# Patient Record
Sex: MALE | Race: WHITE | NOT HISPANIC OR LATINO | URBAN - METROPOLITAN AREA
[De-identification: names, ages, dates, MRNs, and addresses within clinical notes are randomized per-mention and may not be internally consistent; named-entity substitution may affect disease eponyms.]

---

## 2017-06-07 PROBLEM — Z00.00 ENCOUNTER FOR PREVENTIVE HEALTH EXAMINATION: Status: ACTIVE | Noted: 2017-06-07

## 2017-06-13 ENCOUNTER — OUTPATIENT (OUTPATIENT)
Dept: OUTPATIENT SERVICES | Facility: HOSPITAL | Age: 72
LOS: 1 days | End: 2017-06-13
Payer: MEDICARE

## 2017-06-13 ENCOUNTER — APPOINTMENT (OUTPATIENT)
Dept: ULTRASOUND IMAGING | Facility: CLINIC | Age: 72
End: 2017-06-13

## 2017-06-13 DIAGNOSIS — Z00.8 ENCOUNTER FOR OTHER GENERAL EXAMINATION: ICD-10-CM

## 2017-06-13 PROCEDURE — 93880 EXTRACRANIAL BILAT STUDY: CPT

## 2019-01-16 ENCOUNTER — MESSAGE (OUTPATIENT)
Age: 74
End: 2019-01-16

## 2019-01-17 ENCOUNTER — APPOINTMENT (OUTPATIENT)
Dept: UROLOGY | Facility: CLINIC | Age: 74
End: 2019-01-17
Payer: MEDICARE

## 2019-01-17 ENCOUNTER — RESULT CHARGE (OUTPATIENT)
Age: 74
End: 2019-01-17

## 2019-01-17 VITALS
DIASTOLIC BLOOD PRESSURE: 85 MMHG | HEART RATE: 73 BPM | WEIGHT: 152 LBS | SYSTOLIC BLOOD PRESSURE: 156 MMHG | BODY MASS INDEX: 21.76 KG/M2 | RESPIRATION RATE: 14 BRPM | HEIGHT: 70 IN

## 2019-01-17 DIAGNOSIS — Z86.39 PERSONAL HISTORY OF OTHER ENDOCRINE, NUTRITIONAL AND METABOLIC DISEASE: ICD-10-CM

## 2019-01-17 DIAGNOSIS — R31.29 OTHER MICROSCOPIC HEMATURIA: ICD-10-CM

## 2019-01-17 DIAGNOSIS — Z86.79 PERSONAL HISTORY OF OTHER DISEASES OF THE CIRCULATORY SYSTEM: ICD-10-CM

## 2019-01-17 DIAGNOSIS — Z87.448 PERSONAL HISTORY OF OTHER DISEASES OF URINARY SYSTEM: ICD-10-CM

## 2019-01-17 LAB
BILIRUB UR QL STRIP: NORMAL
CLARITY UR: CLEAR
COLLECTION METHOD: NORMAL
GLUCOSE UR-MCNC: NORMAL
HCG UR QL: 0.2 EU/DL
HGB UR QL STRIP.AUTO: NORMAL
KETONES UR-MCNC: NORMAL
LEUKOCYTE ESTERASE UR QL STRIP: NORMAL
NITRITE UR QL STRIP: NORMAL
PH UR STRIP: 5.5
PROT UR STRIP-MCNC: NORMAL
SP GR UR STRIP: 1.01

## 2019-01-17 PROCEDURE — 99204 OFFICE O/P NEW MOD 45 MIN: CPT

## 2019-01-21 PROBLEM — Z86.79 HISTORY OF HYPERTENSION: Status: RESOLVED | Noted: 2019-01-17 | Resolved: 2019-01-21

## 2019-01-21 PROBLEM — Z87.448 HISTORY OF HEMATURIA: Status: RESOLVED | Noted: 2019-01-17 | Resolved: 2019-01-21

## 2019-01-21 PROBLEM — Z86.39 HISTORY OF DIABETES MELLITUS: Status: RESOLVED | Noted: 2019-01-17 | Resolved: 2019-01-21

## 2019-01-21 PROBLEM — R31.29 MICROHEMATURIA: Status: ACTIVE | Noted: 2019-01-21

## 2019-01-21 RX ORDER — SIMVASTATIN 80 MG/1
80 TABLET, FILM COATED ORAL
Refills: 0 | Status: ACTIVE | COMMUNITY

## 2019-01-21 RX ORDER — METFORMIN HYDROCHLORIDE 500 MG/1
500 TABLET, COATED ORAL
Refills: 0 | Status: ACTIVE | COMMUNITY

## 2019-01-21 NOTE — ASSESSMENT
[FreeTextEntry1] : Reviewed outside records. \par \par Benign Prostatic Hyperplasia:\par No bothersome lower urinary tract symptoms and minimal post void residual. \par \par Bladder mass:\par Microhematuria:\par Discussed the differential diagnosis of hematuria including benign and malignant pathology- including but not limited to nephrolithiasis, bladder stone, urinary tract infection, glomerular disease, renal cancer, bladder cancer, prostate cancer. We also discussed the chance that workup will not reveal a source for the bleeding. The patient understands that the hematuria could be from an upper tract (kidney or ureter) or lower tract (bladder, urethra, prostate) and that workup includes imaging and direct visualization of all of these.\par \par Will proceed with work up with Urinalysis with microscopy, Urine culture, Urine cytology, CT Urogram and Cystoscopy. \par \par Return to office after CT scan. \par

## 2019-01-21 NOTE — PHYSICAL EXAM
[General Appearance - Well Developed] : well developed [Normal Appearance] : normal appearance [General Appearance - In No Acute Distress] : no acute distress [] : no respiratory distress [Abdomen Soft] : soft [Abdomen Tenderness] : non-tender [Abdomen Mass (___ Cm)] : no abdominal mass palpated [Costovertebral Angle Tenderness] : no ~M costovertebral angle tenderness [Urethral Meatus] : meatus normal [Penis Abnormality] : normal circumcised penis [Scrotum] : the scrotum was normal [Epididymis] : the epididymides were normal [Testes Tenderness] : no tenderness of the testes [Testes Mass (___cm)] : there were no testicular masses [Normal Station and Gait] : the gait and station were normal for the patient's age [Skin Color & Pigmentation] : normal skin color and pigmentation [No Focal Deficits] : no focal deficits [Oriented To Time, Place, And Person] : oriented to person, place, and time [No Palpable Adenopathy] : no palpable adenopathy [FreeTextEntry1] : Prostate partially palpated, non tender, no nodule in the palpated part

## 2019-01-21 NOTE — HISTORY OF PRESENT ILLNESS
[FreeTextEntry1] : 72 yo male presents for Bladder mass.\par Recently had urine test and was told has microscopic hematuria. \par Underwent Renal and Bladder Ultrasound- Bladder mass.\par Denies gross hematuria, no history of kidney stones or recurrent urinary tract infections. \par Smoker- past, 1PPD for 30 yrs, quit- 15 yrs ago.\par Occupational exposure- No.\par Reports reasonable stream, urinates every few hours or so during the day depending on fluid: water, coffee/tea and soda intake. Nocturia of 0-1 x. \par Denies hesitancy, straining, intermittency, urgency, incontinence, sense of incomplete emptying.\par Denies dysuria, lower abdominal or flank pain, fever, chills or rigors.\par Normal erections and libido. \par No family history of Prostate cancer.

## 2019-01-21 NOTE — LETTER BODY
[Dear  ___] : Dear  [unfilled], [Consult Letter:] : I had the pleasure of evaluating your patient, [unfilled]. [( Thank you for referring [unfilled] for consultation for _____ )] : Thank you for referring [unfilled] for consultation for [unfilled] [Please see my note below.] : Please see my note below. [Consult Closing:] : Thank you very much for allowing me to participate in the care of this patient.  If you have any questions, please do not hesitate to contact me. [Sincerely,] : Sincerely, [FreeTextEntry3] : Naren Webb MD\par  of Urology\par [facility]\par \par Offices:\par [facility] @\par [address], [place] [address]\par and\par [address], [place] [address], [address]\par and\par [address] [address]\par \par TEL: [phone]\par FAX: [phone]\par

## 2019-01-29 ENCOUNTER — MESSAGE (OUTPATIENT)
Age: 74
End: 2019-01-29

## 2019-01-31 ENCOUNTER — APPOINTMENT (OUTPATIENT)
Dept: UROLOGY | Facility: CLINIC | Age: 74
End: 2019-01-31
Payer: MEDICARE

## 2019-01-31 VITALS
DIASTOLIC BLOOD PRESSURE: 80 MMHG | BODY MASS INDEX: 21.76 KG/M2 | WEIGHT: 152 LBS | SYSTOLIC BLOOD PRESSURE: 153 MMHG | HEART RATE: 79 BPM | HEIGHT: 70 IN

## 2019-01-31 DIAGNOSIS — N32.89 OTHER SPECIFIED DISORDERS OF BLADDER: ICD-10-CM

## 2019-01-31 PROCEDURE — 51798 US URINE CAPACITY MEASURE: CPT | Mod: 59

## 2019-01-31 PROCEDURE — 51741 ELECTRO-UROFLOWMETRY FIRST: CPT

## 2019-01-31 PROCEDURE — 99214 OFFICE O/P EST MOD 30 MIN: CPT | Mod: 25

## 2019-01-31 PROCEDURE — 52000 CYSTOURETHROSCOPY: CPT

## 2019-01-31 NOTE — ASSESSMENT
[FreeTextEntry1] : Kidney stone:\par Discussed the management options for non obstructing kidney stones- watch and wait Vs Ureteroscopy Vs Shock wave lithotripsy(if radio-opaque) Vs Percutaneous nephrolithotomy. Risks and benefits of each modality were discussed. Pt not interested in active intervention at this point. \par Recommended Kidney stone prevention diet:\par Good oral hydration so that urine is clear to light yellow, usually 1.5 to 2 Liters of fluids, mainly water\par Increasing Citrate in diet by consuming citrus fruits and juices- willa, limes, oranges, grapefruits and berries \par Less red meat\par Less salt\par Limit foods with oxalate like- dark green vegetables, rhubarb, chocolate, wheat bran, nuts, cranberries, and beans\par \par Benign Prostatic Hyperplasia:\par See Uroflo and PVR.\par Prescribed Flomax. Explained common side effects: nasal congestion, cough, muscle pain, back pain, dizziness, orthostatic hypotension, somnolence, retrograde ejaculation, decreased libido and erectile dysfunction.\par  \par Bladder mass:\par On Cystoscopy today: no Bladder mass, Moderate prostatic enlargement with coapting lateral lobes and a medium median lobe, trabeculations and multiple cellules. \par Will get Urinalysis with reflex Urine culture and Urine cytology.\par \par Return to office in 3 months or sooner if any issues- will do Uroflo and PVR.

## 2019-01-31 NOTE — LETTER BODY
[Dear  ___] : Dear  [unfilled], [Courtesy Letter:] : I had the pleasure of seeing your patient, [unfilled], in my office today. [Please see my note below.] : Please see my note below. [Sincerely,] : Sincerely, [FreeTextEntry3] : Naren Webb MD\par  of Urology\par [facility]\par \par Offices:\par [facility] @\par [address], [place] [address]\par and\par [address], [place] [address], [address]\par and\par [address] [address]\par \par TEL: [phone]\par FAX: [phone]

## 2019-01-31 NOTE — HISTORY OF PRESENT ILLNESS
[FreeTextEntry1] : 72 yo male presents for follow up. \par No new complaint. \par Denies dysuria, hematuria, lower abdominal or flank pain, fever, chills or rigors. \par \par Initially seen for Bladder mass.\par Recently had urine test and was told has microscopic hematuria. \par Underwent Renal and Bladder Ultrasound- Bladder mass.\par Denied gross hematuria, no history of kidney stones or recurrent urinary tract infections. \par Smoker- past, 1PPD for 30 yrs, quit- 15 yrs ago.\par Occupational exposure- No.\par Reported reasonable stream, urinates every few hours or so during the day depending on fluid: water, coffee/tea and soda intake. Nocturia of 0-1 x. \par Denied hesitancy, straining, intermittency, urgency, incontinence, sense of incomplete emptying.\par \par Normal erections and libido. \par No family history of Prostate cancer.

## 2019-02-01 LAB
APPEARANCE: CLEAR
BILIRUBIN URINE: NEGATIVE
BLOOD URINE: NEGATIVE
COLOR: YELLOW
GLUCOSE QUALITATIVE U: NEGATIVE MG/DL
KETONES URINE: NEGATIVE
LEUKOCYTE ESTERASE URINE: NEGATIVE
NITRITE URINE: NEGATIVE
PH URINE: 7.5
PROTEIN URINE: NEGATIVE MG/DL
SPECIFIC GRAVITY URINE: 1.01
UROBILINOGEN URINE: NEGATIVE MG/DL

## 2019-04-19 ENCOUNTER — MEDICATION RENEWAL (OUTPATIENT)
Age: 74
End: 2019-04-19

## 2019-05-09 ENCOUNTER — APPOINTMENT (OUTPATIENT)
Dept: UROLOGY | Facility: CLINIC | Age: 74
End: 2019-05-09
Payer: MEDICARE

## 2019-05-09 PROCEDURE — 51798 US URINE CAPACITY MEASURE: CPT

## 2019-05-09 PROCEDURE — 99214 OFFICE O/P EST MOD 30 MIN: CPT | Mod: 25

## 2019-05-13 NOTE — ASSESSMENT
[FreeTextEntry1] : Kidney stone:\par Continue to observe.\par \par Benign Prostatic Hyperplasia:\par Continue Flomax.\par \par Return to office in 6 months or sooner if any issues.

## 2019-05-13 NOTE — LETTER BODY
[Courtesy Letter:] : I had the pleasure of seeing your patient, [unfilled], in my office today. [Dear  ___] : Dear  [unfilled], [Please see my note below.] : Please see my note below. [Sincerely,] : Sincerely, [FreeTextEntry3] : Naren Webb MD\par  of Urology\par [facility]\par \par Offices:\par [facility] @\par [address], [place] [address]\par and\par [address], [place] [address], [address]\par and\par [address] [address]\par \par TEL: [phone]\par FAX: [phone]

## 2019-05-13 NOTE — HISTORY OF PRESENT ILLNESS
[FreeTextEntry1] : 72 yo male presents for follow up. \par Taking Flomax- improved flow.\par Denies dysuria, hematuria, lower abdominal or flank pain, fever, chills or rigors. \par At last visit on Cystoscopy: no Bladder mass, Moderate prostatic enlargement with coapting lateral lobes and a medium median lobe.\par \par Initially seen for Bladder mass.\par Recently had urine test and was told has microscopic hematuria. \par Underwent Renal and Bladder Ultrasound- Bladder mass.\par Denied gross hematuria, no history of kidney stones or recurrent urinary tract infections. \par Smoker- past, 1PPD for 30 yrs, quit- 15 yrs ago.\par Occupational exposure- No.\par Reported reasonable stream, urinates every few hours or so during the day depending on fluid: water, coffee/tea and soda intake. Nocturia of 0-1 x. \par Denied hesitancy, straining, intermittency, urgency, incontinence, sense of incomplete emptying.\par \par Normal erections and libido. \par No family history of Prostate cancer.

## 2019-11-06 ENCOUNTER — MESSAGE (OUTPATIENT)
Age: 74
End: 2019-11-06

## 2019-11-07 ENCOUNTER — APPOINTMENT (OUTPATIENT)
Dept: UROLOGY | Facility: CLINIC | Age: 74
End: 2019-11-07
Payer: MEDICARE

## 2019-11-07 PROCEDURE — 51798 US URINE CAPACITY MEASURE: CPT | Mod: 59

## 2019-11-07 PROCEDURE — 99214 OFFICE O/P EST MOD 30 MIN: CPT | Mod: 25

## 2019-11-07 PROCEDURE — 51741 ELECTRO-UROFLOWMETRY FIRST: CPT

## 2019-11-11 NOTE — LETTER BODY
[Please see my note below.] : Please see my note below. [Courtesy Letter:] : I had the pleasure of seeing your patient, [unfilled], in my office today. [Dear  ___] : Dear  [unfilled], [FreeTextEntry3] : Naren Webb MD\par  of Urology\par [facility]\par \par Offices:\par [facility] @\par [address], [place] [address]\par and\par [address], [place] [address], [address]\par and\par [address] [address]\par \par TEL: [phone]\par FAX: [phone]  [Sincerely,] : Sincerely,

## 2019-11-11 NOTE — HISTORY OF PRESENT ILLNESS
[FreeTextEntry1] : 72 yo male presents for follow up. \par Taking Flomax- urinating well. few, nocturia 1 x. \par Denies dysuria, hematuria, lower abdominal or flank pain, fever, chills or rigors. \par \par Cystoscopy(Jan 2019): no Bladder mass, Moderate prostatic enlargement with coapting lateral lobes and a medium median lobe.\par \par Initially seen for Bladder mass.\par Recently had urine test and was told has microscopic hematuria. \par Underwent Renal and Bladder Ultrasound- Bladder mass.\par Denied gross hematuria, no history of kidney stones or recurrent urinary tract infections. \par Smoker- past, 1PPD for 30 yrs, quit- 15 yrs ago.\par Occupational exposure- No.\par Reported reasonable stream, urinates every few hours or so during the day depending on fluid: water, coffee/tea and soda intake. Nocturia of 0-1 x. \par Denied hesitancy, straining, intermittency, urgency, incontinence, sense of incomplete emptying.\par \par Normal erections and libido. \par No family history of Prostate cancer.

## 2019-11-11 NOTE — ASSESSMENT
[FreeTextEntry1] : Kidney stone:\par Continue to observe.\par \par Benign Prostatic Hyperplasia:\par See Uroflo and PVR.\par Continue Flomax.\par \par Return to office in 1 year or sooner if any issues- will do Uroflo and PVR.

## 2020-11-10 ENCOUNTER — APPOINTMENT (OUTPATIENT)
Dept: UROLOGY | Facility: CLINIC | Age: 75
End: 2020-11-10
Payer: MEDICARE

## 2020-11-10 PROCEDURE — 99213 OFFICE O/P EST LOW 20 MIN: CPT | Mod: 25

## 2020-11-10 PROCEDURE — 51798 US URINE CAPACITY MEASURE: CPT | Mod: 59

## 2020-11-10 PROCEDURE — 51741 ELECTRO-UROFLOWMETRY FIRST: CPT

## 2020-11-19 NOTE — ASSESSMENT
[FreeTextEntry1] : Reviewed outside records. \par PSA 2.9 in May 2020. \par \par Benign Prostatic Hyperplasia:\par See Uroflo and PVR. \par Continue Flomax. \par \par Return to office in 1 year or sooner if any issues- will do Uroflo and PVR

## 2020-11-19 NOTE — HISTORY OF PRESENT ILLNESS
[FreeTextEntry1] : 73 yo male presents for follow up. \par Taking Flomax- urinating well. Urinates every few hours or so during the day, nocturia 1 x. \par Denies dysuria, hematuria, lower abdominal or flank pain, fever, chills or rigors. \par \par Cystoscopy(Jan 2019): no Bladder mass, Moderate prostatic enlargement with coapting lateral lobes and a medium median lobe.\par \par Initially seen for Bladder mass.\par Recently had urine test and was told has microscopic hematuria. \par Underwent Renal and Bladder Ultrasound- Bladder mass.\par Denied gross hematuria, no history of kidney stones or recurrent urinary tract infections. \par Smoker- past, 1PPD for 30 yrs, quit- 15 yrs ago.\par Occupational exposure- No.\par Reported reasonable stream, urinates every few hours or so during the day depending on fluid: water, coffee/tea and soda intake. Nocturia of 0-1 x. \par Denied hesitancy, straining, intermittency, urgency, incontinence, sense of incomplete emptying.\par \par Normal erections and libido. \par No family history of Prostate cancer.

## 2020-11-19 NOTE — PHYSICAL EXAM
[Urethral Meatus] : meatus normal [Penis Abnormality] : normal circumcised penis [Scrotum] : the scrotum was normal [Epididymis] : the epididymides were normal [Testes Tenderness] : no tenderness of the testes [Testes Mass (___cm)] : there were no testicular masses [General Appearance - Well Developed] : well developed [Normal Appearance] : normal appearance [General Appearance - In No Acute Distress] : no acute distress [Abdomen Soft] : soft [Abdomen Tenderness] : non-tender [Skin Color & Pigmentation] : normal skin color and pigmentation [FreeTextEntry1] : normal peripheral circulation  [] : no respiratory distress [Oriented To Time, Place, And Person] : oriented to person, place, and time [Normal Station and Gait] : the gait and station were normal for the patient's age [No Focal Deficits] : no focal deficits

## 2020-11-19 NOTE — LETTER BODY
[Dear  ___] : Dear  [unfilled], [Courtesy Letter:] : I had the pleasure of seeing your patient, [unfilled], in my office today. [Please see my note below.] : Please see my note below. [Sincerely,] : Sincerely, [FreeTextEntry3] : Naren Webb MD\par  of Urology\par [facility]\par \par Offices:\par [facility] @\par [address], [place] [address]\par and\par [address], [place] [address], [address]\par and\par [address] [address]\par \par TEL: [phone]\par FAX: [phone]

## 2021-09-21 ENCOUNTER — APPOINTMENT (OUTPATIENT)
Dept: PULMONOLOGY | Facility: CLINIC | Age: 76
End: 2021-09-21
Payer: MEDICARE

## 2021-09-21 VITALS
HEIGHT: 70 IN | BODY MASS INDEX: 21.9 KG/M2 | SYSTOLIC BLOOD PRESSURE: 163 MMHG | TEMPERATURE: 97.2 F | WEIGHT: 153 LBS | DIASTOLIC BLOOD PRESSURE: 78 MMHG | OXYGEN SATURATION: 99 % | HEART RATE: 58 BPM

## 2021-09-21 PROCEDURE — 99204 OFFICE O/P NEW MOD 45 MIN: CPT

## 2021-09-21 RX ORDER — TIOTROPIUM BROMIDE 18 UG/1
18 CAPSULE ORAL; RESPIRATORY (INHALATION) DAILY
Qty: 1 | Refills: 4 | Status: ACTIVE | COMMUNITY
Start: 2021-09-21 | End: 1900-01-01

## 2021-09-21 NOTE — HISTORY OF PRESENT ILLNESS
[TextBox_4] : 76 yo gentleman, patient of Dr Briceño, had seen Dr No pulmonary for a number of years and had PFTs and CT scans for COPD and previous right spontaneous pneumothorax\par Has not been able to continue with Dr No\par \par Smoker of 1 1/2 ppd for 40 years until 2008\par Stopped in 2008 \par Treated for COPD--has been on Spiriva once a day but does not require rescue inhaler use at all\par Patient is s/p aortic stent in 2008\par Hx HT on irbesatan\par Hx diabetes on Metformin\par Hx of HLD on simvastatin\par \par Good exercise ability\par \par Review of CTs at [facility] including last CT in 2019:\par Mild changes consistent with COPD/emphysema\par Aortic stent in place\par No pulmonary masses

## 2021-10-13 ENCOUNTER — NON-APPOINTMENT (OUTPATIENT)
Age: 76
End: 2021-10-13

## 2021-10-13 VITALS — BODY MASS INDEX: 22.19 KG/M2 | HEIGHT: 70 IN | WEIGHT: 155 LBS

## 2021-10-13 DIAGNOSIS — Z78.9 OTHER SPECIFIED HEALTH STATUS: ICD-10-CM

## 2021-10-13 NOTE — HISTORY OF PRESENT ILLNESS
[TextBox_13] : Referred by Dr. Cooper Carlos.\par \par Mr. RAMOS is a 75 year old male with a history of HTN, COPD, RIGHT recurrent pneumothorax s/p surgery and skin CA.\par \par He was called to review eligibility for Low-Dose CT lung cancer screening.  Reviewed and confirmed that the patient meets screening eligibility criteria:\par \par 75 years old \par \par Smoking Status: Former smoker\par \par Number of pack(s) per day: 1.5\par Number of years smoked: 40\par Number of pack years smokin\par \par Number of years since quitting smokin\par Quit year: \par \par Mr. RAMOS denies any symptoms of lung cancer, including new cough, change in cough, hemoptysis, and unintentional weight loss.\par \par Mr. RAMOS denies any personal history of lung cancer.  No lung cancer in a first degree relative.  Denies any history of occupational exposures.

## 2021-10-14 ENCOUNTER — APPOINTMENT (OUTPATIENT)
Dept: CT IMAGING | Facility: CLINIC | Age: 76
End: 2021-10-14
Payer: MEDICARE

## 2021-10-14 ENCOUNTER — OUTPATIENT (OUTPATIENT)
Dept: OUTPATIENT SERVICES | Facility: HOSPITAL | Age: 76
LOS: 1 days | End: 2021-10-14
Payer: MEDICARE

## 2021-10-14 DIAGNOSIS — J44.9 CHRONIC OBSTRUCTIVE PULMONARY DISEASE, UNSPECIFIED: ICD-10-CM

## 2021-10-14 PROCEDURE — 71271 CT THORAX LUNG CANCER SCR C-: CPT

## 2021-10-14 PROCEDURE — 71271 CT THORAX LUNG CANCER SCR C-: CPT | Mod: 26

## 2021-11-16 ENCOUNTER — APPOINTMENT (OUTPATIENT)
Dept: PULMONOLOGY | Facility: CLINIC | Age: 76
End: 2021-11-16
Payer: MEDICARE

## 2021-11-16 VITALS
TEMPERATURE: 97.2 F | SYSTOLIC BLOOD PRESSURE: 153 MMHG | HEART RATE: 60 BPM | WEIGHT: 155 LBS | OXYGEN SATURATION: 98 % | BODY MASS INDEX: 22.19 KG/M2 | HEIGHT: 70 IN | DIASTOLIC BLOOD PRESSURE: 76 MMHG

## 2021-11-16 PROCEDURE — 99215 OFFICE O/P EST HI 40 MIN: CPT

## 2021-11-16 RX ORDER — IRBESARTAN 150 MG/1
150 TABLET ORAL
Refills: 0 | Status: DISCONTINUED | COMMUNITY
End: 2021-11-16

## 2021-11-16 RX ORDER — ASPIRIN/ACETAMINOPHEN/CAFFEINE 500-325-65
325 POWDER IN PACKET (EA) ORAL
Refills: 0 | Status: DISCONTINUED | COMMUNITY
End: 2021-11-16

## 2021-11-16 RX ORDER — METOPROLOL TARTRATE 25 MG/1
25 TABLET, FILM COATED ORAL
Refills: 0 | Status: DISCONTINUED | COMMUNITY
End: 2021-11-16

## 2021-11-16 NOTE — CONSULT LETTER
[Dear  ___] : Dear  [unfilled], [FreeTextEntry1] : I had the pleasure of evaluating your patient, LOUIE RAMOS , in the office today.  Please review my consultation and evaluation report that follows below.  Please do not hesitate to call me if further information is necessary or if you wish to discuss ongoing care or diagnostic work-up.   \par I very much appreciate your referral and it is a privilege to be able to provide care for your patient.\par \par Sincerely,\par  \par Cooper Carlos MD, MHCM, FACP, ROLANDO-C\par Pulmonary Medicine\par  of Medicine\par [facility] at \[facility] [place] [facility]\""/[facility]\par [email or web address] \par [facility] in [place]\par [address]\[place]  [address]\par    Fax \par \par [facility]\par [address]\par [place], [place] \par \par

## 2021-11-16 NOTE — ASSESSMENT
[FreeTextEntry1] : 76 yo gentleman with COPD on Spiriva\par Sent for Lung cancer screening:  No new nodules, stable findings One year f/u recommended\par \par Smoker of 1 1/2 ppd for 40 years until 2008\par Stopped in 2008 \par Treated for COPD--has been on Spiriva once a day but does not require rescue inhaler use at all\par Patient is s/p aortic stent in 2008\par Hx HT on irbesatan\par Hx diabetes on Metformin\par Hx of HLD on simvastatin\par \par Breathing is stable, using spiriva daily with minimal use of albuterol\par \par Imp Stable mild COPD in exsmoker\par Recommend continue Rx spiriva at this time\par \par Patient and his wife are not vaccinated for Covid\par Long discussion regarding his elevated risk of covid infection considering age and his comborbities\par He remains skeptical but I have been clear about risks and benefits as well as my recommendation

## 2021-11-16 NOTE — HISTORY OF PRESENT ILLNESS
[TextBox_4] : 74 yo gentleman with COPD on Spiriva\par Sent for Lung cancer screening:  No new nodules, stable findings One year f/u recommended\par \par Smoker of 1 1/2 ppd for 40 years until 2008\par Stopped in 2008 \par Treated for COPD--has been on Spiriva once a day but does not require rescue inhaler use at all\par Patient is s/p aortic stent in 2008\par Hx HT on irbesatan\par Hx diabetes on Metformin\par Hx of HLD on simvastatin\par \par Breathing is stable, using spiriva daily with minimal use of albuterol

## 2021-12-21 ENCOUNTER — APPOINTMENT (OUTPATIENT)
Dept: UROLOGY | Facility: CLINIC | Age: 76
End: 2021-12-21
Payer: MEDICARE

## 2021-12-21 VITALS — TEMPERATURE: 97.7 F

## 2021-12-21 PROCEDURE — 51798 US URINE CAPACITY MEASURE: CPT

## 2021-12-21 PROCEDURE — 51741 ELECTRO-UROFLOWMETRY FIRST: CPT

## 2021-12-21 PROCEDURE — 99214 OFFICE O/P EST MOD 30 MIN: CPT | Mod: 25

## 2022-01-02 NOTE — PHYSICAL EXAM
[Urethral Meatus] : meatus normal [Penis Abnormality] : normal circumcised penis [Scrotum] : the scrotum was normal [Epididymis] : the epididymides were normal [Testes Tenderness] : no tenderness of the testes [Testes Mass (___cm)] : there were no testicular masses [Prostate Tenderness] : the prostate was not tender [No Prostate Nodules] : no prostate nodules [Prostate Size ___ (0-4)] : prostate size [unfilled] (scale: 0-4) [Normal Appearance] : normal appearance [General Appearance - In No Acute Distress] : no acute distress [Abdomen Soft] : soft [Abdomen Tenderness] : non-tender [Costovertebral Angle Tenderness] : no ~M costovertebral angle tenderness [Skin Color & Pigmentation] : normal skin color and pigmentation [] : no respiratory distress [Oriented To Time, Place, And Person] : oriented to person, place, and time [Normal Station and Gait] : the gait and station were normal for the patient's age [FreeTextEntry1] : normal peripheral circulation

## 2022-01-02 NOTE — HISTORY OF PRESENT ILLNESS
[FreeTextEntry1] : 77 yo male presents for follow up. \par Taking Flomax- urinating well, reasonable stream.\par Takes diuretic in the morning and has frequency, after that 5-6 x. Nocturia 0-1 x. \par Denies dysuria, hematuria, lower abdominal or flank pain, nausea, vomiting, fever, chills or rigors. \par No urinary incontinence. \par \par Cystoscopy(Jan 2019): no Bladder mass, Moderate prostatic enlargement with coapting lateral lobes and a medium median lobe.\par \par Initially seen for Bladder mass.\par Recently had urine test and was told has microscopic hematuria. \par Underwent Renal and Bladder Ultrasound- Bladder mass.\par Denied gross hematuria, no history of kidney stones or recurrent urinary tract infections. \par Smoker- past, 1PPD for 30 yrs, quit- 15 yrs ago.\par Occupational exposure- No.\par Reported reasonable stream, urinates every few hours or so during the day depending on fluid: water, coffee/tea and soda intake. Nocturia of 0-1 x. \par Denied hesitancy, straining, intermittency, urgency, incontinence, sense of incomplete emptying.\par \par Normal erections and libido. \par No family history of Prostate cancer.

## 2022-01-02 NOTE — ASSESSMENT
[FreeTextEntry1] : Reviewed outside records on [facility]. \par \par Benign Prostatic Hyperplasia:\par See Uroflo and PVR. \par Continue Flomax. \par \par Return to office in 1 year or sooner if any issues: will do Uroflo/PVR.

## 2022-05-11 ENCOUNTER — APPOINTMENT (OUTPATIENT)
Dept: PULMONOLOGY | Facility: CLINIC | Age: 77
End: 2022-05-11
Payer: MEDICARE

## 2022-05-11 VITALS
HEART RATE: 57 BPM | BODY MASS INDEX: 22.19 KG/M2 | SYSTOLIC BLOOD PRESSURE: 145 MMHG | WEIGHT: 155 LBS | DIASTOLIC BLOOD PRESSURE: 81 MMHG | TEMPERATURE: 97.4 F | OXYGEN SATURATION: 95 % | HEIGHT: 70 IN

## 2022-05-11 DIAGNOSIS — Z71.85 ENCOUNTER FOR IMMUNIZATION SAFETY COUNSELING: ICD-10-CM

## 2022-05-11 PROCEDURE — 99214 OFFICE O/P EST MOD 30 MIN: CPT

## 2022-05-11 RX ORDER — TIOTROPIUM BROMIDE 18 UG/1
18 CAPSULE ORAL; RESPIRATORY (INHALATION) DAILY
Qty: 1 | Refills: 3 | Status: ACTIVE | COMMUNITY
Start: 2022-05-11 | End: 1900-01-01

## 2022-05-11 NOTE — HISTORY OF PRESENT ILLNESS
[TextBox_4] : 74 yo gentleman with COPD on Spiriva\par Smoker of 1 1/2 ppd for 40 years until 2008\par Stopped in 2008 \par Treated for COPD--has been on Spiriva once a day but does not require rescue inhaler use at all\par Patient is s/p aortic stent in 2008\par Hx HT on irbesatan\par Hx diabetes on Metformin\par Hx of HLD on simvastatin\par \par Recently say Dr Murguia\par Has not been vaccinated for Covid\par \par Continues to use spiriva\par

## 2022-05-11 NOTE — ASSESSMENT
[FreeTextEntry1] : 74 yo gentleman with COPD on Spiriva\par Smoker of 1 1/2 ppd for 40 years until 2008\par Stopped in 2008 \par Treated for COPD--has been on Spiriva once a day but does not require rescue inhaler use at all\par Patient is s/p aortic stent in 2008\par Hx HT on irbesatan\par Hx diabetes on Metformin\par Hx of HLD on simvastatin\par \par Recently say Dr Murguia\par Has not been vaccinated for Covid\par \par Continues to use spiriva\par \par \par 74 yo man with history of previous smoking until 2008\par Mild COPD on spiriva but does not require albuterol at this time\par Feeling well\par \par Will order next Lung cancer screen in Nov and revisit\par Renew meds\par Vaccine counseling again re: covid but he remains quite resistant\par

## 2022-05-11 NOTE — CONSULT LETTER
[Dear  ___] : Dear  [unfilled], [FreeTextEntry1] : I had the pleasure of evaluating your patient, LOUIE RAMOS , in the office today.  Please review my consultation and evaluation report that follows below.  Please do not hesitate to call me if further information is necessary or if you wish to discuss ongoing care or diagnostic work-up.   \par I very much appreciate your referral and it is a privilege to be able to provide care for your patient.\par \par Sincerely,\par  \par Cooper Carlos MD, MHCM, FACP, ROLANDO-C\par Pulmonary Medicine\par  of Medicine\par [facility] at [facility]\par [email or web address]\par \par [facility] in [place]\par [address]\[place]  [address]\par    Fax \par \par [facility] [place] [facility]\par [address]\par [place], [place] \par \par

## 2022-11-03 ENCOUNTER — NON-APPOINTMENT (OUTPATIENT)
Age: 77
End: 2022-11-03

## 2022-11-03 VITALS — HEIGHT: 70 IN | BODY MASS INDEX: 22.19 KG/M2 | WEIGHT: 155 LBS

## 2022-11-03 NOTE — HISTORY OF PRESENT ILLNESS
[Former] : Former [TextBox_13] : Referred by Dr. Cooper Carlos.\par \par Mr. RAMOS is a 76 year old male with a history of HTN, COPD, RIGHT recurrent pneumothorax s/p surgery and skin CA.  Reviewed and confirmed that the patient meets screening eligibility criteria:\par \par 76 years old \par \par Smoking Status: Former smoker\par \par Number of pack(s) per day: 1.5\par Number of years smoked: 40\par Number of pack years smokin\par \par Number of years since quitting smokin\par Quit year: \par \par No symptoms of lung cancer, including new cough, change in cough, hemoptysis, and unintentional weight loss.\par \par No personal history of lung cancer. No lung cancer in a first degree relative. No history of occupational exposures.  [YearQuit] : 2008 [PacksperDay] : 1.5 [N_Years] : 40 [PacksperYear] : 60

## 2022-11-07 ENCOUNTER — APPOINTMENT (OUTPATIENT)
Dept: CT IMAGING | Facility: CLINIC | Age: 77
End: 2022-11-07

## 2022-11-07 ENCOUNTER — OUTPATIENT (OUTPATIENT)
Dept: OUTPATIENT SERVICES | Facility: HOSPITAL | Age: 77
LOS: 1 days | End: 2022-11-07
Payer: MEDICARE

## 2022-11-07 DIAGNOSIS — Z87.891 PERSONAL HISTORY OF NICOTINE DEPENDENCE: ICD-10-CM

## 2022-11-07 PROCEDURE — 71271 CT THORAX LUNG CANCER SCR C-: CPT | Mod: 26

## 2022-11-07 PROCEDURE — 71271 CT THORAX LUNG CANCER SCR C-: CPT

## 2022-11-15 ENCOUNTER — APPOINTMENT (OUTPATIENT)
Dept: PULMONOLOGY | Facility: CLINIC | Age: 77
End: 2022-11-15

## 2022-11-15 VITALS
DIASTOLIC BLOOD PRESSURE: 76 MMHG | BODY MASS INDEX: 22.19 KG/M2 | SYSTOLIC BLOOD PRESSURE: 162 MMHG | WEIGHT: 155 LBS | TEMPERATURE: 97.7 F | HEIGHT: 70 IN | HEART RATE: 63 BPM | OXYGEN SATURATION: 98 %

## 2022-11-15 PROCEDURE — 99214 OFFICE O/P EST MOD 30 MIN: CPT

## 2022-11-15 NOTE — HISTORY OF PRESENT ILLNESS
[TextBox_4] : Very pleasant 77 yo gentleman exsmoker until 2008\par Treated for COPD with Spiriva daily\par He has no complaints and he gets around fairly well\par HT on irbestartan\par Hx Diabetes, HLD , s/p aortic stent\par Not vaccinated for COVID\par \par Had most recent lung cancer screening on Nov 7 2022\par \par \par PROCEDURE DATE:  11/07/2022\par \par \par \par INTERPRETATION:  INDICATION: 60 pack year history of smoking.  Individual is Former smoker.  Lung cancer screening.\par \par TECHNIQUE: Low dose CT scan of the chest was obtained without intravenous contrast.\par \par COMPARISON: CT chest 10/14/2021 and 4/3/2015.\par \par FINDINGS:\par \par Lymph nodes: The visualized thyroid gland is unremarkable. There are no enlarged chest lymph nodes. The esophagus is unremarkable.\par \par Heart/vasculature: The heart is normal in size. Coronary calcifications. No pericardial effusion. Aortic valve calcifications. Left-sided aortic arch and left-sided descending thoracic aorta. Aortic root measures 3.8 cm at the sinuses of Valsalva. Ascending aorta measures 3.9 cm at the main pulmonary artery. Aortic calcifications. Status post thoracic endovascular aortic repair within the descending thoracic aorta.\par \par Airways/lungs/pleura: The right upper lobe 4 mm noncalcified nodules unchanged. The remainder of the lungs are clear. In particular, the left upper lobe nodule reported on 10/14/2021 is not visualized.\par \par Emphysema. The remainder of the lungs are clear. The airways and pleura are normal.\par \par Upper abdomen: The upper abdomen is unremarkable.\par \par \par IMPRESSION:\par \par \par 1.  No change in the pulmonary nodules since 10/14/2021.\par 2.  Lung RADS: 2.\par 3.  Recommendation: CT chest without contrast in 12 months.

## 2022-11-15 NOTE — CONSULT LETTER
[Dear  ___] : Dear  [unfilled], [FreeTextEntry1] : I had the pleasure of evaluating your patient, LOUIE RAMOS , in the office today.  Please review my consultation and evaluation report that follows below.  Please do not hesitate to call me if further information is necessary or if you wish to discuss ongoing care or diagnostic work-up.   \par I very much appreciate your referral and it is a privilege to be able to provide care for your patient.\par \par Sincerely,\par  \par Cooper Carlos MD, MHCM, FACP, ROLANDO-C\par Pulmonary Medicine\par  of Medicine\par [facility] at [facility]/Health system\par [email or web address]\par \par Health system [facility] in [place]\par [address]\[place]  [address]\par    Fax \par \par Multi-Specialties at [place]\par [address]\par [place], [place] \par \par

## 2022-11-15 NOTE — ASSESSMENT
[FreeTextEntry1] : Very pleasant 77 yo gentleman exsmoker until 2008\par Treated for COPD with Spiriva daily\par He has no complaints and he gets around fairly well\par HT on irbestartan\par Hx Diabetes, HLD , s/p aortic stent\par Not vaccinated for COVID\par \par Had most recent lung cancer screening on Nov 7 2022\par \par \par PROCEDURE DATE:  11/07/2022\par \par \par \par INTERPRETATION:  INDICATION: 60 pack year history of smoking.  Individual is Former smoker.  Lung cancer screening.\par \par TECHNIQUE: Low dose CT scan of the chest was obtained without intravenous contrast.\par \par COMPARISON: CT chest 10/14/2021 and 4/3/2015.\par \par FINDINGS:\par \par Lymph nodes: The visualized thyroid gland is unremarkable. There are no enlarged chest lymph nodes. The esophagus is unremarkable.\par \par Heart/vasculature: The heart is normal in size. Coronary calcifications. No pericardial effusion. Aortic valve calcifications. Left-sided aortic arch and left-sided descending thoracic aorta. Aortic root measures 3.8 cm at the sinuses of Valsalva. Ascending aorta measures 3.9 cm at the main pulmonary artery. Aortic calcifications. Status post thoracic endovascular aortic repair within the descending thoracic aorta.\par \par Airways/lungs/pleura: The right upper lobe 4 mm noncalcified nodules unchanged. The remainder of the lungs are clear. In particular, the left upper lobe nodule reported on 10/14/2021 is not visualized.\par \par Emphysema. The remainder of the lungs are clear. The airways and pleura are normal.\par \par Upper abdomen: The upper abdomen is unremarkable.\par \par \par IMPRESSION:\par \par \par 1.  No change in the pulmonary nodules since 10/14/2021.\par 2.  Lung RADS: 2.\par \par \par Imp 77 yo gentleman with COPD, well managed at this time on Spiriva\par Ex smoker and recent Lung Cancer Screening was unchanged\par He is eligible for one more screening in Fall of 2023\par Will see here for interim evaluation in 6 months

## 2022-12-20 ENCOUNTER — APPOINTMENT (OUTPATIENT)
Dept: UROLOGY | Facility: CLINIC | Age: 77
End: 2022-12-20

## 2022-12-20 PROCEDURE — 99214 OFFICE O/P EST MOD 30 MIN: CPT | Mod: 25

## 2022-12-20 PROCEDURE — 51741 ELECTRO-UROFLOWMETRY FIRST: CPT

## 2022-12-20 PROCEDURE — 51798 US URINE CAPACITY MEASURE: CPT

## 2022-12-20 NOTE — ASSESSMENT
[FreeTextEntry1] : Reviewed outside records on [facility]. \par 10/25/22:\par Creatinine: 0.78 \par Urinalysis: negative except Ketones. \par \par EXAM:  ULTRASOUND ABDOMEN COMPLETE\par  \par HISTORY:  Male, 75 years-old for routine follow-up with a history of aortic aneurysm\par  \par TECHNIQUE:  Using real-time ultrasonography with a high-resolution broadband phased-array curved transducer, multiplanar gray scale images were obtained and supplemented with color Doppler. Static images are provided for review.\par  \par COMPARISON: 2/5/2020\par  \par FINDINGS:  \par Liver:  Normal in size, morphology, contour and echotexture. There is a stable hepatic cyst in the right lobe measuring 1 cm in diameter. The visualized portion of portal and hepatic veins are unremarkable. No intrahepatic biliary duct dilatation.\par  \par Gallbladder: Unremarkable\par  \par Common Bile Duct: Normal measuring 0.4 cm diameter at the anastacia hepatis.\par  \par Pancreas: The visualized portion of the body of the pancreas is within normal limits. The tail is obscured by overlying bowel gas. No pancreatic duct dilatation.\par  \par Kidneys: Normal in size, morphology and cortical echotexture. \par No hydronephrosis or perinephric fluid.\par Right Kidney: 9.7 cm in length. There is an echogenic focus with acoustical shadowing in the mid right renal pelvis measuring 0.5 cm in diameter. A second 0.4 cm calcification is also suspected.\par Left Kidney:   10.8 cm in length. Previously described left renal stones are not visualized.\par  \par Spleen: Normal size, contour and echotexture measuring 7.6 cm in length. \par  \par Abdominal Aorta/IVC: The distal aorta measures 4.3 cm in diameter consistent with a known abdominal aortic aneurysm. This has enlarged since the prior exam (prior 3.9 cm). Visualized portions of the IVC were patent.\par  \par IMPRESSION:\par 1. Interval progression of the abdominal aortic aneurysm that currently measures 4.3 cm in diameter, compared to 3.9 cm. Continued follow-up is advised.\par 2. Nonobstructing right renal stones.\par 3. Stable hepatic cyst.\par \par Benign Prostatic Hyperplasia:\par See Uroflo and PVR. \par Discussed treatment options, will continue Flomax. \par \par Kidney stone:\par Discussed the management options for non obstructing kidney stones- watch and wait Vs Ureteroscopy Vs Shock wave lithotripsy- if radio-opaque or ultrasound amenable. \par Risks and benefits of each modality were discussed. \par Patient will observe. \par Recommended Kidney stone prevention diet:\par Good oral hydration so that urine is clear to light yellow, usually 1.5 to 2 Liters of fluids, mainly water\par Increasing Citrate in diet by consuming citrus fruits and juices- willa, limes, oranges, grapefruits and berries \par Less red meat\par Less salt\par Limit foods with oxalate like- dark green vegetables, rhubarb, chocolate, wheat bran, nuts, cranberries, and beans\par  \par Return to office in 1 year or sooner if any issues: will do Uroflo/PVR.

## 2022-12-20 NOTE — LETTER BODY
[Dear  ___] : Dear  [unfilled], [Courtesy Letter:] : I had the pleasure of seeing your patient, [unfilled], in my office today. [Please see my note below.] : Please see my note below. [Sincerely,] : Sincerely, [FreeTextEntry3] : Nraen Webb MD\par  of Urology\par [facility]\par \par [facility]\par Offices:\par [address] [facility]\par [address] [address]\par [address], [address]\par \par TEL: [phone]\par FAX: [phone]  DISPLAY PLAN FREE TEXT

## 2022-12-20 NOTE — HISTORY OF PRESENT ILLNESS
[FreeTextEntry1] : 78 yo male presents for follow up. \par Taking Flomax, has reasonable stream. \par Takes diuretic in the morning and has frequency, after that 5-6 x. Nocturia 0-1 x. \par Denies hesitancy, straining, intermittency, urgency, incontinence, sense of incomplete emptying. \par Denies dysuria, hematuria, lower abdominal or flank pain, nausea, vomiting, fever, chills or rigors. \par \par Cystoscopy(Jan 2019): no Bladder mass, Moderate prostatic enlargement with coapting lateral lobes and a medium median lobe.\par \par Initially seen for Bladder mass.\par Recently had urine test and was told has microscopic hematuria. \par Underwent Renal and Bladder Ultrasound- Bladder mass.\par Denied gross hematuria, no history of kidney stones or recurrent urinary tract infections. \par Smoker- past, 1PPD for 30 yrs, quit- 15 yrs ago.\par Occupational exposure- No.\par Reported reasonable stream, urinates every few hours or so during the day depending on fluid: water, coffee/tea and soda intake. Nocturia of 0-1 x. \par Denied hesitancy, straining, intermittency, urgency, incontinence, sense of incomplete emptying.\par \par Normal erections and libido. \par No family history of Prostate cancer. 
no

## 2023-02-06 NOTE — REVIEW OF SYSTEMS
Pt arrived via EMS w/ reports of multiple seizures today, per EMS pt has been having seizures every 10 minutes for a total of 10 seizures today, they are described as staring off into space and arm twitching, EMS also states pt was seen here recently with the same symptoms and found that her symptoms correlate w/ having high blood sugar. Pt A&O x 1, BEFAST negative, able to follow commands.    [Negative] : Heme/Lymph

## 2023-05-16 ENCOUNTER — APPOINTMENT (OUTPATIENT)
Dept: PULMONOLOGY | Facility: CLINIC | Age: 78
End: 2023-05-16
Payer: MEDICARE

## 2023-05-16 VITALS
TEMPERATURE: 97.2 F | DIASTOLIC BLOOD PRESSURE: 75 MMHG | HEIGHT: 70 IN | HEART RATE: 67 BPM | BODY MASS INDEX: 22.19 KG/M2 | OXYGEN SATURATION: 97 % | WEIGHT: 155 LBS | SYSTOLIC BLOOD PRESSURE: 124 MMHG

## 2023-05-16 DIAGNOSIS — Z87.891 PERSONAL HISTORY OF NICOTINE DEPENDENCE: ICD-10-CM

## 2023-05-16 PROCEDURE — 99214 OFFICE O/P EST MOD 30 MIN: CPT

## 2023-05-16 NOTE — CONSULT LETTER
[Dear  ___] : Dear  [unfilled], [FreeTextEntry1] : I had the pleasure of evaluating your patient, LOUIE RAMOS , in the office today.  Please review my consultation and evaluation report that follows below.  Please do not hesitate to call me if further information is necessary or if you wish to discuss ongoing care or diagnostic work-up.   \par I very much appreciate your referral and it is a privilege to be able to provide care for your patient.\par \par Sincerely,\par  \par Cooper Carlos MD, MHCM, FACP, ROLANDO-C\par Pulmonary Medicine\par  of Medicine\par [facility] at [facility]\par [email or web address] \par [facility] in [place]\par [address]\[place]  [address]\par    Fax \par \par Multi-Specialties at [place]\par [address]\par [place], [place] \par \par

## 2023-05-16 NOTE — ASSESSMENT
[FreeTextEntry1] : 78 yo gentleman last seen in Nov 22 for COPD\par Exsmoker since 2008 and insists he has not smoked since\par Treated with Spiriva daily for COPD, he says he never takes a rescue inhaler\par He does not note any difficulty in daily ambulation--only when he does vigourous activity\par Can walk a flight of steps with no problem\par \par Hx HT on Irbesartan\par Hx Diabetes, HLD, s/p aortic stent\par \par Imp 78 yo man with known COPD which appears well managed with spiriva only\par Encouraged to use albuterol PRN which he says he has but hasn’t used regularly\par \par Will order last Lung Cancer screening for November and see patient after that time

## 2023-05-16 NOTE — HISTORY OF PRESENT ILLNESS
[TextBox_4] : 76 yo gentleman last seen in Nov 22 for COPD\par Exsmoker since 2008 and insists he has not smoked since\par Treated with Spiriva daily for COPD, he says he never takes a rescue inhaler\par He does not note any difficulty in daily ambulation--only when he does vigourous activity\par Can walk a flight of steps with no problem\par \par Hx HT on Irbesartan\par Hx Diabetes, HLD, s/p aortic stent

## 2023-11-09 ENCOUNTER — OFFICE (OUTPATIENT)
Facility: LOCATION | Age: 78
Setting detail: OPHTHALMOLOGY
End: 2023-11-09
Payer: MEDICARE

## 2023-11-09 DIAGNOSIS — H01.001: ICD-10-CM

## 2023-11-09 DIAGNOSIS — H01.005: ICD-10-CM

## 2023-11-09 DIAGNOSIS — H26.40: ICD-10-CM

## 2023-11-09 DIAGNOSIS — H35.433: ICD-10-CM

## 2023-11-09 DIAGNOSIS — H43.393: ICD-10-CM

## 2023-11-09 DIAGNOSIS — E11.9: ICD-10-CM

## 2023-11-09 DIAGNOSIS — Z13.5: ICD-10-CM

## 2023-11-09 DIAGNOSIS — Z96.1: ICD-10-CM

## 2023-11-09 DIAGNOSIS — H01.002: ICD-10-CM

## 2023-11-09 DIAGNOSIS — H01.004: ICD-10-CM

## 2023-11-09 PROCEDURE — 92014 COMPRE OPH EXAM EST PT 1/>: CPT | Performed by: OPHTHALMOLOGY

## 2023-11-09 PROCEDURE — 92250 FUNDUS PHOTOGRAPHY W/I&R: CPT | Mod: GY | Performed by: OPHTHALMOLOGY

## 2023-11-09 ASSESSMENT — LID EXAM ASSESSMENTS
OD_DERMATOCHALASIS: 1+
OS_DERMATOCHALASIS: 1+
OD_BLEPHARITIS: RLL RUL T
OS_BLEPHARITIS: LLL LUL T

## 2023-11-09 ASSESSMENT — CONFRONTATIONAL VISUAL FIELD TEST (CVF)
OD_FINDINGS: FULL
OS_FINDINGS: FULL

## 2023-11-10 ASSESSMENT — REFRACTION_CURRENTRX
OD_AXIS: 002
OS_CYLINDER: +1.25
OS_OVR_VA: 20/
OS_AXIS: 178
OS_SPHERE: -4.00
OD_OVR_VA: 20/
OD_SPHERE: -3.75
OD_CYLINDER: +1.25

## 2023-11-10 ASSESSMENT — REFRACTION_AUTOREFRACTION
OD_SPHERE: -4.00
OD_CYLINDER: +2.50
OS_CYLINDER: +3.00
OS_AXIS: 161
OD_AXIS: 004
OS_SPHERE: -8.00

## 2023-11-10 ASSESSMENT — SPHEQUIV_DERIVED
OD_SPHEQUIV: -2.75
OS_SPHEQUIV: -6.5

## 2023-11-28 ENCOUNTER — APPOINTMENT (OUTPATIENT)
Dept: PULMONOLOGY | Facility: CLINIC | Age: 78
End: 2023-11-28
Payer: MEDICARE

## 2023-11-28 VITALS
DIASTOLIC BLOOD PRESSURE: 70 MMHG | OXYGEN SATURATION: 97 % | TEMPERATURE: 96.2 F | WEIGHT: 160 LBS | BODY MASS INDEX: 22.9 KG/M2 | RESPIRATION RATE: 16 BRPM | HEIGHT: 70 IN | HEART RATE: 61 BPM | SYSTOLIC BLOOD PRESSURE: 129 MMHG

## 2023-11-28 PROCEDURE — 99213 OFFICE O/P EST LOW 20 MIN: CPT

## 2023-11-29 RX ORDER — TIOTROPIUM BROMIDE 18 UG/1
18 CAPSULE ORAL; RESPIRATORY (INHALATION) DAILY
Qty: 1 | Refills: 4 | Status: ACTIVE | COMMUNITY
Start: 2022-11-15 | End: 1900-01-01

## 2023-12-19 ENCOUNTER — APPOINTMENT (OUTPATIENT)
Dept: UROLOGY | Facility: CLINIC | Age: 78
End: 2023-12-19
Payer: MEDICARE

## 2023-12-19 VITALS
WEIGHT: 160 LBS | HEIGHT: 70 IN | OXYGEN SATURATION: 94 % | DIASTOLIC BLOOD PRESSURE: 73 MMHG | HEART RATE: 62 BPM | SYSTOLIC BLOOD PRESSURE: 127 MMHG | BODY MASS INDEX: 22.9 KG/M2

## 2023-12-19 DIAGNOSIS — N20.0 CALCULUS OF KIDNEY: ICD-10-CM

## 2023-12-19 DIAGNOSIS — N13.8 BENIGN PROSTATIC HYPERPLASIA WITH LOWER URINARY TRACT SYMPMS: ICD-10-CM

## 2023-12-19 DIAGNOSIS — N40.1 BENIGN PROSTATIC HYPERPLASIA WITH LOWER URINARY TRACT SYMPMS: ICD-10-CM

## 2023-12-19 PROCEDURE — 51741 ELECTRO-UROFLOWMETRY FIRST: CPT

## 2023-12-19 PROCEDURE — 99214 OFFICE O/P EST MOD 30 MIN: CPT | Mod: 25

## 2023-12-19 RX ORDER — TAMSULOSIN HYDROCHLORIDE 0.4 MG/1
0.4 CAPSULE ORAL
Qty: 90 | Refills: 3 | Status: ACTIVE | COMMUNITY
Start: 2019-01-31 | End: 1900-01-01

## 2023-12-19 NOTE — PHYSICAL EXAM
[Normal Appearance] : normal appearance [General Appearance - In No Acute Distress] : no acute distress [Abdomen Tenderness] : non-tender [Abdomen Soft] : soft [Urethral Meatus] : meatus normal [Penis Abnormality] : normal circumcised penis [Scrotum] : the scrotum was normal [Epididymis] : the epididymides were normal [Testes Tenderness] : no tenderness of the testes [Testes Mass (___cm)] : there were no testicular masses [Prostate Tenderness] : the prostate was not tender [No Prostate Nodules] : no prostate nodules [Prostate Size ___ (0-4)] : prostate size [unfilled] (scale: 0-4) [] : no respiratory distress [Oriented To Time, Place, And Person] : oriented to person, place, and time [Normal Station and Gait] : the gait and station were normal for the patient's age

## 2024-01-01 PROBLEM — N20.0 KIDNEY STONE ON RIGHT SIDE: Status: ACTIVE | Noted: 2019-01-31

## 2024-01-01 PROBLEM — N40.1 BPH WITH OBSTRUCTION/LOWER URINARY TRACT SYMPTOMS: Status: ACTIVE | Noted: 2019-01-21

## 2024-01-01 NOTE — ASSESSMENT
[FreeTextEntry1] : Reviewed records including [facility].  Discussed labs.   12/5/2023: Creatinine: 0.84   Benign Prostatic Hyperplasia: See uroflow and post void residual.  Discussed treatment options, will continue Flomax.   Kidney stone: Discussed treatment options. Will continue to observe.    Return to office in 1 year or sooner if any issues: will do uroflow and check post void residual.

## 2024-01-01 NOTE — HISTORY OF PRESENT ILLNESS
[FreeTextEntry1] : Primary care physician: Dr Josie Murguia, [facility].   78 years old male presents for follow up.  Taking Flomax, has reasonable stream.  Takes diuretic in the morning and has frequency, after that 5-6 x and nocturia 0-1 x.  Denies hesitancy, straining, intermittency, urgency, incontinence or sense of incomplete emptying.  Denies dysuria, hematuria, lower abdominal or flank pain, nausea, vomiting, fever, chills or rigors.   CT Angiogram (9/13/2023): 0.4 cm non obstructing right lower pole kidney stone.   Cystoscopy (Jan 2019):  No Bladder mass, Moderate prostatic enlargement with coapting lateral lobes and a medium median lobe.  Initially seen for Bladder mass. Recently had urine test and was told has microscopic hematuria.  Underwent Renal and Bladder Ultrasound- Bladder mass. Denied gross hematuria, no history of kidney stones or recurrent urinary tract infections.  Smoker- past, 1PPD for 30 yrs, quit- 15 yrs ago. Occupational exposure- No. Reported reasonable stream, urinates every few hours or so during the day depending on fluid: water, coffee/tea and soda intake. Nocturia of 0-1 x.  Denied hesitancy, straining, intermittency, urgency, incontinence, sense of incomplete emptying.  Normal erections and libido.  No family history of Prostate cancer.

## 2024-01-01 NOTE — LETTER BODY
[Dear  ___] : Dear  [unfilled], [Courtesy Letter:] : I had the pleasure of seeing your patient, [unfilled], in my office today. [Please see my note below.] : Please see my note below. [Sincerely,] : Sincerely, [FreeTextEntry3] : Naren Webb MD\par   of Urology\par  [facility]\par  \par  [facility]\par  Offices:\par  [address]\par  [address] [address]\par  [address], [address]\par  \par  TEL: [phone]\par  FAX: [phone]

## 2024-05-10 ENCOUNTER — OFFICE (OUTPATIENT)
Facility: LOCATION | Age: 79
Setting detail: OPHTHALMOLOGY
End: 2024-05-10
Payer: MEDICARE

## 2024-05-10 DIAGNOSIS — H01.001: ICD-10-CM

## 2024-05-10 DIAGNOSIS — Z96.1: ICD-10-CM

## 2024-05-10 DIAGNOSIS — H01.004: ICD-10-CM

## 2024-05-10 DIAGNOSIS — E11.9: ICD-10-CM

## 2024-05-10 DIAGNOSIS — H26.40: ICD-10-CM

## 2024-05-10 DIAGNOSIS — H35.433: ICD-10-CM

## 2024-05-10 DIAGNOSIS — H43.812: ICD-10-CM

## 2024-05-10 DIAGNOSIS — H01.002: ICD-10-CM

## 2024-05-10 DIAGNOSIS — H43.393: ICD-10-CM

## 2024-05-10 DIAGNOSIS — H31.29: ICD-10-CM

## 2024-05-10 DIAGNOSIS — H01.005: ICD-10-CM

## 2024-05-10 DIAGNOSIS — H44.22: ICD-10-CM

## 2024-05-10 PROCEDURE — 92014 COMPRE OPH EXAM EST PT 1/>: CPT | Performed by: OPHTHALMOLOGY

## 2024-05-10 PROCEDURE — 92134 CPTRZ OPH DX IMG PST SGM RTA: CPT | Performed by: OPHTHALMOLOGY

## 2024-05-10 ASSESSMENT — LID EXAM ASSESSMENTS
OS_BLEPHARITIS: LLL LUL T
OS_DERMATOCHALASIS: 1+
OD_DERMATOCHALASIS: 1+
OD_BLEPHARITIS: RLL RUL T

## 2024-05-10 ASSESSMENT — CONFRONTATIONAL VISUAL FIELD TEST (CVF)
OS_FINDINGS: FULL
OD_FINDINGS: FULL

## 2024-05-14 ENCOUNTER — APPOINTMENT (OUTPATIENT)
Dept: PULMONOLOGY | Facility: CLINIC | Age: 79
End: 2024-05-14

## 2024-05-21 ENCOUNTER — APPOINTMENT (OUTPATIENT)
Dept: PULMONOLOGY | Facility: CLINIC | Age: 79
End: 2024-05-21
Payer: MEDICARE

## 2024-05-21 VITALS
WEIGHT: 155 LBS | OXYGEN SATURATION: 95 % | BODY MASS INDEX: 22.19 KG/M2 | HEIGHT: 70 IN | HEART RATE: 61 BPM | DIASTOLIC BLOOD PRESSURE: 68 MMHG | SYSTOLIC BLOOD PRESSURE: 115 MMHG | TEMPERATURE: 97.6 F

## 2024-05-21 DIAGNOSIS — J44.9 CHRONIC OBSTRUCTIVE PULMONARY DISEASE, UNSPECIFIED: ICD-10-CM

## 2024-05-21 PROCEDURE — 99213 OFFICE O/P EST LOW 20 MIN: CPT

## 2024-05-21 NOTE — ASSESSMENT
[FreeTextEntry1] : 77 yo gentleman last seen in Nov 22 for COPD Exsmoker since 2008 and insists he has not smoked since Treated with Spiriva daily for COPD, he says he never takes a rescue inhaler He does not note any difficulty in daily ambulation--only when he does vigourous activity Can walk a flight of steps with no problem Hx HT on Irbesartan Hx Diabetes, HLD, s/p aortic stent. CT chest in Sept 23 to evaluate his aorta This film reviewed at [facility] and shows NO pulmonary nodule or density at this time A previously noted 4 mm RUL nodule was not noted.  Interim: Taking tiotropium regularly with no use of albuterol Good spirits and doing nicely Remains active  Imp 77 yo man with mild COPD on spiriva daily Hx HT on irbesartan and metoprolol Aorta dilation followed by [facility] Will see again in one year unless otherwise required

## 2024-05-21 NOTE — CONSULT LETTER
[Dear  ___] : Dear  [unfilled], [FreeTextEntry1] : I had the pleasure of evaluating your patient, LOUIE RAMOS , in the office today.  Please review my consultation and evaluation report that follows below.  Please do not hesitate to call me if further information is necessary or if you wish to discuss ongoing care or diagnostic work-up.    I very much appreciate your referral and it is a privilege to be able to provide care for your patient.  Sincerely,   Cooper Carlos MD, MHCM, FACP, ROLANDO-C Pulmonary Medicine  of Medicine [facility] at [facility] [email or web address] in [address] [place]  [address]    Fax   Multi-Specialties at [address]  [phone]

## 2024-05-21 NOTE — HISTORY OF PRESENT ILLNESS
[TextBox_4] : 77 yo gentleman last seen in Nov 22 for COPD Exsmoker since 2008 and insists he has not smoked since Treated with Spiriva daily for COPD, he says he never takes a rescue inhaler He does not note any difficulty in daily ambulation--only when he does vigourous activity Can walk a flight of steps with no problem Hx HT on Irbesartan Hx Diabetes, HLD, s/p aortic stent. CT chest in Sept 23 to evaluate his aorta This film reviewed at [facility] and shows NO pulmonary nodule or density at this time A previously noted 4 mm RUL nodule was not noted.  Interim: Taking tiotropium regularly with no use of albuterol Good spirits and doing nicely Remains active

## 2024-11-12 ENCOUNTER — OFFICE (OUTPATIENT)
Facility: LOCATION | Age: 79
Setting detail: OPHTHALMOLOGY
End: 2024-11-12
Payer: MEDICARE

## 2024-11-12 ENCOUNTER — APPOINTMENT (OUTPATIENT)
Dept: PULMONOLOGY | Facility: CLINIC | Age: 79
End: 2024-11-12

## 2024-11-12 DIAGNOSIS — H35.363: ICD-10-CM

## 2024-11-12 DIAGNOSIS — H44.22: ICD-10-CM

## 2024-11-12 DIAGNOSIS — H43.393: ICD-10-CM

## 2024-11-12 DIAGNOSIS — H26.40: ICD-10-CM

## 2024-11-12 DIAGNOSIS — E11.9: ICD-10-CM

## 2024-11-12 PROCEDURE — 92014 COMPRE OPH EXAM EST PT 1/>: CPT | Performed by: OPHTHALMOLOGY

## 2024-11-12 PROCEDURE — 92250 FUNDUS PHOTOGRAPHY W/I&R: CPT | Performed by: OPHTHALMOLOGY

## 2024-11-12 ASSESSMENT — TONOMETRY
OD_IOP_MMHG: 14
OS_IOP_MMHG: 14

## 2024-11-12 ASSESSMENT — LID EXAM ASSESSMENTS
OS_BLEPHARITIS: LLL LUL T
OD_DERMATOCHALASIS: 1+
OD_BLEPHARITIS: RLL RUL T
OS_DERMATOCHALASIS: 1+

## 2024-11-12 ASSESSMENT — CONFRONTATIONAL VISUAL FIELD TEST (CVF)
OD_FINDINGS: FULL
OS_FINDINGS: FULL

## 2024-11-13 ASSESSMENT — VISUAL ACUITY
OD_BCVA: 20/200
OS_BCVA: 20/30-1

## 2024-11-13 ASSESSMENT — REFRACTION_AUTOREFRACTION
OS_CYLINDER: +3.25
OD_SPHERE: -4.25
OS_SPHERE: -8.75
OD_CYLINDER: +2.50
OS_AXIS: 155
OD_AXIS: 006

## 2024-11-13 ASSESSMENT — REFRACTION_CURRENTRX
OD_OVR_VA: 20/
OD_CYLINDER: +1.00
OS_AXIS: 000
OD_AXIS: 000
OD_VPRISM_DIRECTION: SV
OD_SPHERE: -3.75
OS_CYLINDER: +1.00
OS_VPRISM_DIRECTION: SV
OS_OVR_VA: 20/
OS_SPHERE: -3.75

## 2024-11-13 ASSESSMENT — KERATOMETRY
OD_AXISANGLE_DEGREES: 010
OS_AXISANGLE_DEGREES: 155
OD_K2POWER_DIOPTERS: 43.00
OS_K1POWER_DIOPTERS: 40.25
OS_K2POWER_DIOPTERS: 42.75
OD_K1POWER_DIOPTERS: 41.00

## 2024-12-17 ENCOUNTER — APPOINTMENT (OUTPATIENT)
Dept: UROLOGY | Facility: CLINIC | Age: 79
End: 2024-12-17

## 2024-12-18 ENCOUNTER — APPOINTMENT (OUTPATIENT)
Dept: PULMONOLOGY | Facility: CLINIC | Age: 79
End: 2024-12-18
Payer: MEDICARE

## 2024-12-18 VITALS — HEART RATE: 76 BPM | OXYGEN SATURATION: 94 % | DIASTOLIC BLOOD PRESSURE: 69 MMHG | SYSTOLIC BLOOD PRESSURE: 116 MMHG

## 2024-12-18 DIAGNOSIS — J44.9 CHRONIC OBSTRUCTIVE PULMONARY DISEASE, UNSPECIFIED: ICD-10-CM

## 2024-12-18 PROCEDURE — 99214 OFFICE O/P EST MOD 30 MIN: CPT

## 2024-12-18 RX ORDER — BENZONATATE 100 MG/1
100 CAPSULE ORAL 3 TIMES DAILY
Qty: 90 | Refills: 3 | Status: ACTIVE | COMMUNITY
Start: 2024-12-18 | End: 1900-01-01

## 2025-01-21 ENCOUNTER — APPOINTMENT (OUTPATIENT)
Dept: UROLOGY | Facility: CLINIC | Age: 80
End: 2025-01-21

## 2025-01-21 VITALS
WEIGHT: 155 LBS | HEART RATE: 59 BPM | SYSTOLIC BLOOD PRESSURE: 145 MMHG | BODY MASS INDEX: 22.24 KG/M2 | DIASTOLIC BLOOD PRESSURE: 78 MMHG

## 2025-01-21 DIAGNOSIS — N40.1 BENIGN PROSTATIC HYPERPLASIA WITH LOWER URINARY TRACT SYMPMS: ICD-10-CM

## 2025-01-21 DIAGNOSIS — R33.9 RETENTION OF URINE, UNSPECIFIED: ICD-10-CM

## 2025-01-21 DIAGNOSIS — N20.0 CALCULUS OF KIDNEY: ICD-10-CM

## 2025-01-21 DIAGNOSIS — N13.8 BENIGN PROSTATIC HYPERPLASIA WITH LOWER URINARY TRACT SYMPMS: ICD-10-CM

## 2025-01-21 PROCEDURE — 51741 ELECTRO-UROFLOWMETRY FIRST: CPT

## 2025-01-21 PROCEDURE — 99214 OFFICE O/P EST MOD 30 MIN: CPT | Mod: 25

## 2025-05-14 ENCOUNTER — OFFICE (OUTPATIENT)
Facility: LOCATION | Age: 80
Setting detail: OPHTHALMOLOGY
End: 2025-05-14
Payer: MEDICARE

## 2025-05-14 DIAGNOSIS — H01.001: ICD-10-CM

## 2025-05-14 DIAGNOSIS — H26.491: ICD-10-CM

## 2025-05-14 DIAGNOSIS — H35.3123: ICD-10-CM

## 2025-05-14 DIAGNOSIS — H01.004: ICD-10-CM

## 2025-05-14 DIAGNOSIS — E11.9: ICD-10-CM

## 2025-05-14 DIAGNOSIS — H01.002: ICD-10-CM

## 2025-05-14 DIAGNOSIS — H01.005: ICD-10-CM

## 2025-05-14 DIAGNOSIS — H35.363: ICD-10-CM

## 2025-05-14 PROCEDURE — 92134 CPTRZ OPH DX IMG PST SGM RTA: CPT | Performed by: OPHTHALMOLOGY

## 2025-05-14 PROCEDURE — 92014 COMPRE OPH EXAM EST PT 1/>: CPT | Performed by: OPHTHALMOLOGY

## 2025-05-14 ASSESSMENT — CONFRONTATIONAL VISUAL FIELD TEST (CVF)
OD_FINDINGS: FULL
OS_FINDINGS: FULL

## 2025-05-14 ASSESSMENT — TONOMETRY
OD_IOP_MMHG: 16
OS_IOP_MMHG: 16

## 2025-05-14 ASSESSMENT — LID EXAM ASSESSMENTS
OD_BLEPHARITIS: RLL RUL T
OS_BLEPHARITIS: LLL LUL T
OD_DERMATOCHALASIS: 1+
OS_DERMATOCHALASIS: 1+

## 2025-05-15 PROBLEM — H26.491 PCO ; RIGHT EYE: Status: ACTIVE | Noted: 2025-05-14

## 2025-05-15 PROBLEM — H35.3123 AGE RELATED MACULAR DEGENERATION DRY; LEFT EYE ADV ATROPHIC W/O SUBFOVEAL INVOLVEMENT: Status: ACTIVE | Noted: 2025-05-14

## 2025-05-15 ASSESSMENT — REFRACTION_AUTOREFRACTION
OD_AXIS: 009
OD_CYLINDER: +2.25
OS_AXIS: 157
OD_SPHERE: -4.00
OS_CYLINDER: +2.50
OS_SPHERE: -7.50

## 2025-05-15 ASSESSMENT — REFRACTION_CURRENTRX
OS_VPRISM_DIRECTION: SV
OD_OVR_VA: 20/
OS_OVR_VA: 20/
OS_SPHERE: -3.75
OS_CYLINDER: +1.00
OD_AXIS: 000
OD_SPHERE: -3.75
OD_CYLINDER: +1.00
OS_AXIS: 000
OD_VPRISM_DIRECTION: SV

## 2025-05-15 ASSESSMENT — KERATOMETRY
OS_K1POWER_DIOPTERS: 40.25
OD_K1POWER_DIOPTERS: 41.00
OD_K2POWER_DIOPTERS: 43.00
OS_AXISANGLE_DEGREES: 155
OD_AXISANGLE_DEGREES: 010
OS_K2POWER_DIOPTERS: 42.75

## 2025-05-15 ASSESSMENT — VISUAL ACUITY
OS_BCVA: 20/30-2
OD_BCVA: 20/150-1

## 2025-06-18 ENCOUNTER — APPOINTMENT (OUTPATIENT)
Dept: PULMONOLOGY | Facility: CLINIC | Age: 80
End: 2025-06-18
Payer: MEDICARE

## 2025-06-18 VITALS
OXYGEN SATURATION: 98 % | BODY MASS INDEX: 22.19 KG/M2 | WEIGHT: 155 LBS | HEIGHT: 70 IN | DIASTOLIC BLOOD PRESSURE: 68 MMHG | TEMPERATURE: 97.9 F | SYSTOLIC BLOOD PRESSURE: 112 MMHG | HEART RATE: 59 BPM

## 2025-06-18 PROCEDURE — 99214 OFFICE O/P EST MOD 30 MIN: CPT

## 2025-06-18 RX ORDER — TIOTROPIUM BROMIDE 18 UG/1
18 CAPSULE ORAL; RESPIRATORY (INHALATION)
Qty: 90 | Refills: 3 | Status: ACTIVE | COMMUNITY
Start: 2025-06-18 | End: 1900-01-01

## 2025-08-05 ENCOUNTER — APPOINTMENT (OUTPATIENT)
Dept: UROLOGY | Facility: CLINIC | Age: 80
End: 2025-08-05

## 2025-08-05 VITALS
OXYGEN SATURATION: 93 % | DIASTOLIC BLOOD PRESSURE: 98 MMHG | HEART RATE: 75 BPM | BODY MASS INDEX: 22.19 KG/M2 | SYSTOLIC BLOOD PRESSURE: 131 MMHG | HEIGHT: 70 IN | WEIGHT: 155 LBS | RESPIRATION RATE: 16 BRPM

## 2025-08-05 DIAGNOSIS — N20.0 CALCULUS OF KIDNEY: ICD-10-CM

## 2025-08-05 DIAGNOSIS — N13.8 BENIGN PROSTATIC HYPERPLASIA WITH LOWER URINARY TRACT SYMPMS: ICD-10-CM

## 2025-08-05 DIAGNOSIS — N40.1 BENIGN PROSTATIC HYPERPLASIA WITH LOWER URINARY TRACT SYMPMS: ICD-10-CM

## 2025-08-05 PROCEDURE — 99214 OFFICE O/P EST MOD 30 MIN: CPT
